# Patient Record
Sex: MALE | Race: BLACK OR AFRICAN AMERICAN | NOT HISPANIC OR LATINO | Employment: OTHER | URBAN - METROPOLITAN AREA
[De-identification: names, ages, dates, MRNs, and addresses within clinical notes are randomized per-mention and may not be internally consistent; named-entity substitution may affect disease eponyms.]

---

## 2022-04-25 DIAGNOSIS — N18.4 CHRONIC KIDNEY DISEASE, STAGE IV (SEVERE): Primary | ICD-10-CM

## 2022-04-27 ENCOUNTER — TELEPHONE (OUTPATIENT)
Dept: NEPHROLOGY | Facility: CLINIC | Age: 77
End: 2022-04-27

## 2022-04-27 NOTE — TELEPHONE ENCOUNTER
----- Message from Lizz Huang sent at 4/27/2022  1:20 PM CDT -----  Contact: Arlin  Type: Needs Medical Advice    Who Called: Arlin pt spouse  Best Call Back Number: [phone]  Additional  Information: Wife called an rescheduled appt to 5/5/22. Stated she's not feeling well  & unable to drive pt today  Please Advise- Thank you

## 2022-05-05 ENCOUNTER — OFFICE VISIT (OUTPATIENT)
Dept: NEPHROLOGY | Facility: CLINIC | Age: 77
End: 2022-05-05
Payer: COMMERCIAL

## 2022-05-05 VITALS
HEIGHT: 68 IN | OXYGEN SATURATION: 96 % | BODY MASS INDEX: 32.72 KG/M2 | DIASTOLIC BLOOD PRESSURE: 76 MMHG | TEMPERATURE: 98 F | HEART RATE: 48 BPM | SYSTOLIC BLOOD PRESSURE: 187 MMHG | WEIGHT: 215.88 LBS

## 2022-05-05 DIAGNOSIS — N18.4 CKD (CHRONIC KIDNEY DISEASE) STAGE 4, GFR 15-29 ML/MIN: Primary | ICD-10-CM

## 2022-05-05 PROCEDURE — 3066F PR DOCUMENTATION OF TREATMENT FOR NEPHROPATHY: ICD-10-PCS | Mod: S$GLB,,, | Performed by: INTERNAL MEDICINE

## 2022-05-05 PROCEDURE — 3288F FALL RISK ASSESSMENT DOCD: CPT | Mod: S$GLB,,, | Performed by: INTERNAL MEDICINE

## 2022-05-05 PROCEDURE — 1126F AMNT PAIN NOTED NONE PRSNT: CPT | Mod: S$GLB,,, | Performed by: INTERNAL MEDICINE

## 2022-05-05 PROCEDURE — 1159F MED LIST DOCD IN RCRD: CPT | Mod: S$GLB,,, | Performed by: INTERNAL MEDICINE

## 2022-05-05 PROCEDURE — 3288F PR FALLS RISK ASSESSMENT DOCUMENTED: ICD-10-PCS | Mod: S$GLB,,, | Performed by: INTERNAL MEDICINE

## 2022-05-05 PROCEDURE — 99213 OFFICE O/P EST LOW 20 MIN: CPT | Mod: S$GLB,,, | Performed by: INTERNAL MEDICINE

## 2022-05-05 PROCEDURE — 1160F PR REVIEW ALL MEDS BY PRESCRIBER/CLIN PHARMACIST DOCUMENTED: ICD-10-PCS | Mod: S$GLB,,, | Performed by: INTERNAL MEDICINE

## 2022-05-05 PROCEDURE — 3066F NEPHROPATHY DOC TX: CPT | Mod: S$GLB,,, | Performed by: INTERNAL MEDICINE

## 2022-05-05 PROCEDURE — 3078F PR MOST RECENT DIASTOLIC BLOOD PRESSURE < 80 MM HG: ICD-10-PCS | Mod: S$GLB,,, | Performed by: INTERNAL MEDICINE

## 2022-05-05 PROCEDURE — 1126F PR PAIN SEVERITY QUANTIFIED, NO PAIN PRESENT: ICD-10-PCS | Mod: S$GLB,,, | Performed by: INTERNAL MEDICINE

## 2022-05-05 PROCEDURE — 3008F BODY MASS INDEX DOCD: CPT | Mod: S$GLB,,, | Performed by: INTERNAL MEDICINE

## 2022-05-05 PROCEDURE — 1160F RVW MEDS BY RX/DR IN RCRD: CPT | Mod: S$GLB,,, | Performed by: INTERNAL MEDICINE

## 2022-05-05 PROCEDURE — 3077F SYST BP >= 140 MM HG: CPT | Mod: S$GLB,,, | Performed by: INTERNAL MEDICINE

## 2022-05-05 PROCEDURE — 3078F DIAST BP <80 MM HG: CPT | Mod: S$GLB,,, | Performed by: INTERNAL MEDICINE

## 2022-05-05 PROCEDURE — 1101F PR PT FALLS ASSESS DOC 0-1 FALLS W/OUT INJ PAST YR: ICD-10-PCS | Mod: S$GLB,,, | Performed by: INTERNAL MEDICINE

## 2022-05-05 PROCEDURE — 3008F PR BODY MASS INDEX (BMI) DOCUMENTED: ICD-10-PCS | Mod: S$GLB,,, | Performed by: INTERNAL MEDICINE

## 2022-05-05 PROCEDURE — 3077F PR MOST RECENT SYSTOLIC BLOOD PRESSURE >= 140 MM HG: ICD-10-PCS | Mod: S$GLB,,, | Performed by: INTERNAL MEDICINE

## 2022-05-05 PROCEDURE — 1159F PR MEDICATION LIST DOCUMENTED IN MEDICAL RECORD: ICD-10-PCS | Mod: S$GLB,,, | Performed by: INTERNAL MEDICINE

## 2022-05-05 PROCEDURE — 1101F PT FALLS ASSESS-DOCD LE1/YR: CPT | Mod: S$GLB,,, | Performed by: INTERNAL MEDICINE

## 2022-05-05 PROCEDURE — 99213 PR OFFICE/OUTPT VISIT, EST, LEVL III, 20-29 MIN: ICD-10-PCS | Mod: S$GLB,,, | Performed by: INTERNAL MEDICINE

## 2022-05-05 NOTE — PROGRESS NOTES
"Nephrology Progress Note      Patient Name:  Adiel Gerber    :  1945    Medical Record:  [id number]    Interval History:  No complaints.    Subjective:  Patient seen and examined. No complaints.    Current Medications: Current medications reviewed.  States hasn't taken all his bp meds this am.    Review of Systems  Review of Systems   Respiratory: Negative for cough, hemoptysis, shortness of breath and wheezing.    Cardiovascular: Negative for chest pain.   Gastrointestinal: Negative for abdominal pain, blood in stool, diarrhea, heartburn, nausea and vomiting.   Genitourinary: Negative for dysuria and hematuria.       Objective:      Physical Exam:   Vital Signs:  BP (!) 187/76 (BP Location: Left arm, Patient Position: Sitting, BP Method: Large (Automatic))   Pulse (!) 48   Temp 97.7 °F (36.5 °C) (Oral)   Ht 5' 8" (1.727 m)   Wt 97.9 kg (215 lb 14.4 oz)   SpO2 96%   BMI 32.83 kg/m²   Constitutional:  WNL  HENT:  Normocephalic, Atraumatic, Pupils Reactive, Normal oropharynx, Nose normal.   Cardiovascular:  Normal heart rate, Normal rhythm, No murmurs, No rubs, No gallops.   Respiratory:  Normal breath sounds, No respiratory distress, No wheezing, No chest tenderness.   GI:  Bowel sounds normal, Soft, No tenderness, No masses, No pulsatile masses.  : No costovertebral angle tenderness    Extremities:  Intact distal pulses, No edema, No tenderness, No cyanosis, No clubbing.   Neurologic:  Alert & oriented x 3, Normal motor function, Normal sensory function, No focal deficits noted.  Skin:  Warm and dry      Labs:  No results for input(s): K, CO2, BUN, ALBUMIN, CALCIUM, GLU, HGB, WBC, PLT, INR in the last 72 hours.    Invalid input(s): CRE  .    Radiology:   None.    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    Plan:  RTC 3 months.    Electronically signed by: Sylvia Fishman, 2022 3:54 PM.    "

## 2022-05-11 ENCOUNTER — TELEPHONE (OUTPATIENT)
Dept: NEPHROLOGY | Facility: CLINIC | Age: 77
End: 2022-05-11
Payer: COMMERCIAL

## 2022-05-11 NOTE — TELEPHONE ENCOUNTER
----- Message from Mayelin Atkinson sent at 5/11/2022  1:23 PM CDT -----  Contact: pt wife  Type: Needs Medical Advice    Who Called: pt wife   Best Call Back Number: [phone]  Inquiry/Question: pt wife calling to see if pt needs to be seen or if catheter could be prescribed thru home health, pt having trouble with bladder control, urinating when standing up or moving to do anything, please advise Thank you~

## 2022-05-12 NOTE — TELEPHONE ENCOUNTER
Spoke with pt's wife and informed of home health can do catheter under supervision of urology as bladder issues are their specialty.

## 2022-08-04 ENCOUNTER — OFFICE VISIT (OUTPATIENT)
Dept: NEPHROLOGY | Facility: CLINIC | Age: 77
End: 2022-08-04
Payer: COMMERCIAL

## 2022-08-04 VITALS
BODY MASS INDEX: 32.68 KG/M2 | TEMPERATURE: 99 F | DIASTOLIC BLOOD PRESSURE: 74 MMHG | HEART RATE: 73 BPM | SYSTOLIC BLOOD PRESSURE: 176 MMHG | WEIGHT: 215.63 LBS | OXYGEN SATURATION: 98 % | HEIGHT: 68 IN

## 2022-08-04 DIAGNOSIS — N18.4 CKD (CHRONIC KIDNEY DISEASE) STAGE 4, GFR 15-29 ML/MIN: Primary | ICD-10-CM

## 2022-08-04 PROCEDURE — 3066F NEPHROPATHY DOC TX: CPT | Mod: S$GLB,,, | Performed by: INTERNAL MEDICINE

## 2022-08-04 PROCEDURE — 1101F PR PT FALLS ASSESS DOC 0-1 FALLS W/OUT INJ PAST YR: ICD-10-PCS | Mod: S$GLB,,, | Performed by: INTERNAL MEDICINE

## 2022-08-04 PROCEDURE — 3078F PR MOST RECENT DIASTOLIC BLOOD PRESSURE < 80 MM HG: ICD-10-PCS | Mod: S$GLB,,, | Performed by: INTERNAL MEDICINE

## 2022-08-04 PROCEDURE — 99213 PR OFFICE/OUTPT VISIT, EST, LEVL III, 20-29 MIN: ICD-10-PCS | Mod: S$GLB,,, | Performed by: INTERNAL MEDICINE

## 2022-08-04 PROCEDURE — 1160F PR REVIEW ALL MEDS BY PRESCRIBER/CLIN PHARMACIST DOCUMENTED: ICD-10-PCS | Mod: S$GLB,,, | Performed by: INTERNAL MEDICINE

## 2022-08-04 PROCEDURE — 1101F PT FALLS ASSESS-DOCD LE1/YR: CPT | Mod: S$GLB,,, | Performed by: INTERNAL MEDICINE

## 2022-08-04 PROCEDURE — 1126F PR PAIN SEVERITY QUANTIFIED, NO PAIN PRESENT: ICD-10-PCS | Mod: S$GLB,,, | Performed by: INTERNAL MEDICINE

## 2022-08-04 PROCEDURE — 3078F DIAST BP <80 MM HG: CPT | Mod: S$GLB,,, | Performed by: INTERNAL MEDICINE

## 2022-08-04 PROCEDURE — 3077F PR MOST RECENT SYSTOLIC BLOOD PRESSURE >= 140 MM HG: ICD-10-PCS | Mod: S$GLB,,, | Performed by: INTERNAL MEDICINE

## 2022-08-04 PROCEDURE — 3288F PR FALLS RISK ASSESSMENT DOCUMENTED: ICD-10-PCS | Mod: S$GLB,,, | Performed by: INTERNAL MEDICINE

## 2022-08-04 PROCEDURE — 1160F RVW MEDS BY RX/DR IN RCRD: CPT | Mod: S$GLB,,, | Performed by: INTERNAL MEDICINE

## 2022-08-04 PROCEDURE — 3077F SYST BP >= 140 MM HG: CPT | Mod: S$GLB,,, | Performed by: INTERNAL MEDICINE

## 2022-08-04 PROCEDURE — 1126F AMNT PAIN NOTED NONE PRSNT: CPT | Mod: S$GLB,,, | Performed by: INTERNAL MEDICINE

## 2022-08-04 PROCEDURE — 3066F PR DOCUMENTATION OF TREATMENT FOR NEPHROPATHY: ICD-10-PCS | Mod: S$GLB,,, | Performed by: INTERNAL MEDICINE

## 2022-08-04 PROCEDURE — 1159F MED LIST DOCD IN RCRD: CPT | Mod: S$GLB,,, | Performed by: INTERNAL MEDICINE

## 2022-08-04 PROCEDURE — 3288F FALL RISK ASSESSMENT DOCD: CPT | Mod: S$GLB,,, | Performed by: INTERNAL MEDICINE

## 2022-08-04 PROCEDURE — 99213 OFFICE O/P EST LOW 20 MIN: CPT | Mod: S$GLB,,, | Performed by: INTERNAL MEDICINE

## 2022-08-04 PROCEDURE — 1159F PR MEDICATION LIST DOCUMENTED IN MEDICAL RECORD: ICD-10-PCS | Mod: S$GLB,,, | Performed by: INTERNAL MEDICINE

## 2022-08-04 NOTE — PROGRESS NOTES
"Nephrology Progress Note      Patient Name:  Adiel Gerber    :  1945    Medical Record:  [id number]    Interval History:   Seeing for ckd.    Subjective:  Patient seen and examined. No complaints.    Current Medications: Current medications reviewed.    Review of Systems  Review of Systems   Respiratory: Negative for cough, hemoptysis, sputum production and shortness of breath.    Cardiovascular: Negative for chest pain, palpitations, orthopnea, claudication and leg swelling.   Gastrointestinal: Negative for abdominal pain, blood in stool, constipation, diarrhea, heartburn, nausea and vomiting.   Genitourinary: Negative for dysuria, frequency, hematuria and urgency.       Objective:      Physical Exam:   Vital Signs:  BP (!) 176/74 (BP Location: Left arm, Patient Position: Sitting, BP Method: Large (Automatic))   Pulse 73   Temp 98.7 °F (37.1 °C) (Oral)   Ht 5' 8" (1.727 m)   Wt 97.8 kg (215 lb 9.6 oz)   SpO2 98%   BMI 32.78 kg/m²   Constitutional:  WNL  HENT:  Normocephalic, Atraumatic, Pupils Reactive, Normal oropharynx, Nose normal.   Cardiovascular:  Normal heart rate, Normal rhythm, No murmurs, No rubs, No gallops.   Respiratory:  Normal breath sounds, No respiratory distress, No wheezing, No chest tenderness.   GI:  Bowel sounds normal, Soft, No tenderness, No masses, No pulsatile masses.  : No costovertebral angle tenderness    Extremities:  Intact distal pulses, ++ edema, No tenderness, No cyanosis, No clubbing.   Neurologic:  Alert & oriented x 3, Normal motor function, Normal sensory function, No focal deficits noted.  Skin:  Warm and dry      Labs:  Recent Labs     22  1045   K 4.1   CO2 28   BUN 95*   ALBUMIN 3.8   CALCIUM 8.0*     .    Radiology:   none    Assessment:  ckd 4- near stage5.      Plan:  K[facility]ney [facility] class.  RTC 3 months.    Electronically signed by: Sylvia Fishman, 2022 3:30 PM.    "

## 2022-12-19 ENCOUNTER — OFFICE VISIT (OUTPATIENT)
Dept: NEPHROLOGY | Facility: CLINIC | Age: 77
End: 2022-12-19
Payer: COMMERCIAL

## 2022-12-19 VITALS
HEART RATE: 63 BPM | WEIGHT: 228.81 LBS | DIASTOLIC BLOOD PRESSURE: 81 MMHG | HEIGHT: 68 IN | SYSTOLIC BLOOD PRESSURE: 204 MMHG | OXYGEN SATURATION: 97 % | BODY MASS INDEX: 34.68 KG/M2

## 2022-12-19 DIAGNOSIS — N18.4 TYPE 2 DIABETES MELLITUS WITH STAGE 4 CHRONIC KIDNEY DISEASE, WITHOUT LONG-TERM CURRENT USE OF INSULIN: ICD-10-CM

## 2022-12-19 DIAGNOSIS — E78.00 HYPERCHOLESTEROLEMIA: ICD-10-CM

## 2022-12-19 DIAGNOSIS — N18.4 STAGE 4 CHRONIC KIDNEY DISEASE: Primary | ICD-10-CM

## 2022-12-19 DIAGNOSIS — I10 PRIMARY HYPERTENSION: ICD-10-CM

## 2022-12-19 DIAGNOSIS — E11.22 TYPE 2 DIABETES MELLITUS WITH STAGE 4 CHRONIC KIDNEY DISEASE, WITHOUT LONG-TERM CURRENT USE OF INSULIN: ICD-10-CM

## 2022-12-19 PROCEDURE — 1101F PT FALLS ASSESS-DOCD LE1/YR: CPT | Mod: S$GLB,,, | Performed by: INTERNAL MEDICINE

## 2022-12-19 PROCEDURE — 99213 OFFICE O/P EST LOW 20 MIN: CPT | Mod: S$GLB,,, | Performed by: INTERNAL MEDICINE

## 2022-12-19 PROCEDURE — 3077F SYST BP >= 140 MM HG: CPT | Mod: S$GLB,,, | Performed by: INTERNAL MEDICINE

## 2022-12-19 PROCEDURE — 1159F PR MEDICATION LIST DOCUMENTED IN MEDICAL RECORD: ICD-10-PCS | Mod: S$GLB,,, | Performed by: INTERNAL MEDICINE

## 2022-12-19 PROCEDURE — 3066F PR DOCUMENTATION OF TREATMENT FOR NEPHROPATHY: ICD-10-PCS | Mod: S$GLB,,, | Performed by: INTERNAL MEDICINE

## 2022-12-19 PROCEDURE — 1126F PR PAIN SEVERITY QUANTIFIED, NO PAIN PRESENT: ICD-10-PCS | Mod: S$GLB,,, | Performed by: INTERNAL MEDICINE

## 2022-12-19 PROCEDURE — 1159F MED LIST DOCD IN RCRD: CPT | Mod: S$GLB,,, | Performed by: INTERNAL MEDICINE

## 2022-12-19 PROCEDURE — 3066F NEPHROPATHY DOC TX: CPT | Mod: S$GLB,,, | Performed by: INTERNAL MEDICINE

## 2022-12-19 PROCEDURE — 3079F DIAST BP 80-89 MM HG: CPT | Mod: S$GLB,,, | Performed by: INTERNAL MEDICINE

## 2022-12-19 PROCEDURE — 3079F PR MOST RECENT DIASTOLIC BLOOD PRESSURE 80-89 MM HG: ICD-10-PCS | Mod: S$GLB,,, | Performed by: INTERNAL MEDICINE

## 2022-12-19 PROCEDURE — 3288F FALL RISK ASSESSMENT DOCD: CPT | Mod: S$GLB,,, | Performed by: INTERNAL MEDICINE

## 2022-12-19 PROCEDURE — 1160F PR REVIEW ALL MEDS BY PRESCRIBER/CLIN PHARMACIST DOCUMENTED: ICD-10-PCS | Mod: S$GLB,,, | Performed by: INTERNAL MEDICINE

## 2022-12-19 PROCEDURE — 3077F PR MOST RECENT SYSTOLIC BLOOD PRESSURE >= 140 MM HG: ICD-10-PCS | Mod: S$GLB,,, | Performed by: INTERNAL MEDICINE

## 2022-12-19 PROCEDURE — 1160F RVW MEDS BY RX/DR IN RCRD: CPT | Mod: S$GLB,,, | Performed by: INTERNAL MEDICINE

## 2022-12-19 PROCEDURE — 1126F AMNT PAIN NOTED NONE PRSNT: CPT | Mod: S$GLB,,, | Performed by: INTERNAL MEDICINE

## 2022-12-19 PROCEDURE — 3288F PR FALLS RISK ASSESSMENT DOCUMENTED: ICD-10-PCS | Mod: S$GLB,,, | Performed by: INTERNAL MEDICINE

## 2022-12-19 PROCEDURE — 1101F PR PT FALLS ASSESS DOC 0-1 FALLS W/OUT INJ PAST YR: ICD-10-PCS | Mod: S$GLB,,, | Performed by: INTERNAL MEDICINE

## 2022-12-19 PROCEDURE — 99213 PR OFFICE/OUTPT VISIT, EST, LEVL III, 20-29 MIN: ICD-10-PCS | Mod: S$GLB,,, | Performed by: INTERNAL MEDICINE

## 2022-12-19 NOTE — PROGRESS NOTES
Pt Name:  Adiel Gerber  Pt :  1945  Pt MRN:  [id number]    Date: 2022  Provider: Sylvia Fishman    Reason for visit: seeing for ckd. No complaints.      Chief Complaint:   Chief Complaint   Patient presents with    Chronic Kidney Disease     Stage 4 cr 3.84 GFR 16    Follow-up     3 month       HPI:  [unfilled]  No complaints. Forgets to take bp pills.    History:   Past Medical History:   Diagnosis Date    Arthritis     CHF (congestive heart failure)     CKD (chronic kidney disease)     HTN (hypertension)     Hypercholesteremia     Hyponatremia     Obesity, unspecified     TOBIAS (obstructive sleep apnea)     Proteinuria     Stroke     Type 2 diabetes mellitus      Past Surgical History:   Procedure Laterality Date    ANKLE SURGERY      HEMORRHOID SURGERY      NASAL SINUS SURGERY       Family History   Problem Relation Age of Onset    Hypertension Father     Hyperlipidemia Father     Diabetes Father     Heart disease Father     Vision loss Father     Diabetes Sister      Social History     Substance and Sexual Activity   Alcohol Use Yes    Comment: occasionally     Social History     Substance and Sexual Activity   Drug Use Never     Social History     Substance and Sexual Activity   Sexual Activity Not Currently     reports that he is not currently sexually active.  Social History     Tobacco Use   Smoking Status Former    Years: 17.00    Types: Cigarettes    Quit date: 2004    Years since quittin.2   Smokeless Tobacco Never       Allergies:  Review of patient's allergies indicates:   Allergen Reactions    Milk containing products Diarrhea       [unfilled]    ROS:   Constitutional:  Denies fever or chills   Eyes:  Denies change in visual acuity   HENT:  Denies nasal congestion or sore throat   Respiratory:  As in the history of the present illness.   Cardiovascular:  As in the history of the present illness.   GI:  As in the history of the present illness.    Musculoskeletal:  Denies back  "pain or joint pain   Integument:  Denies rash   Neurologic:  Denies headache, focal weakness or sensory changes   Endocrine:  Denies polyuria or polydipsia   Lymphatic:  Denies swollen glands   Psychiatric:  Denies depression or anxiety    Physical Exam:   Vitals:   Vitals:    12/19/22 1322 12/19/22 1327   BP: (!) 201/83 (!) 204/81   Pulse: 63    SpO2: 97%    Weight: 103.8 kg (228 lb 12.8 oz)    Height: 5' 8" (1.727 m)    PainSc: 0-No pain      Body mass index is 34.79 kg/m².    Constitutional:  Well developed, well nourished, and in no acute distress   Eyes:  PERRLA, conjunctiva normal   HENT:  Atraumatic, external ears normal, nose normal.  Neck: There is no jugular venous distension or thyromegaly.   Respiratory:  No respiratory distress, normal breath sounds, no rales, no wheezing   Cardiovascular:  Normal rate, and a regular rhythm, no murmurs, no gallops, no rub, and no edema.  GI:  Normal bowel sounds.  Musculoskeletal:  No deformities.   Neurologic:  Alert & oriented x 3, CN 2-12 normal, normal motor function, and no asterixis.   Psychiatric:  Speech and behavior appropriate.    Labs/Tests:  Lab Results   Component Value Date     11/03/2022     11/03/2022    K 4.2 11/03/2022    K 4.2 11/03/2022     11/03/2022     11/03/2022    CO2 27 11/03/2022    CO2 27 11/03/2022    BUN 74 (H) 11/03/2022    BUN 74 (H) 11/03/2022    CREATININE 3.79 (H) 11/03/2022    CREATININE 3.79 (H) 11/03/2022    CREATININE 3.77 (H) 08/03/2022    GLUCOSE 478 (H) 11/03/2022    GLUCOSE 478 (H) 11/03/2022    CALCIUM 7.5 (L) 11/03/2022    CALCIUM 7.5 (L) 11/03/2022    PHOSPHORUS 5.3 (H) 11/03/2022    ALBUMIN 3.9 11/03/2022    ALBUMIN 3.9 11/03/2022    EGFRNONAA 15 (L) 11/03/2022    EGFRNONAA 15 (L) 11/03/2022    EGFRNONAA 15 (L) 08/03/2022    ESTGFRAFRICA 17 (L) 11/03/2022    ESTGFRAFRICA 17 (L) 11/03/2022    ESTGFRAFRICA 17 (L) 08/03/2022     (H) 12/14/2022    HGB 8.7 (L) 12/14/2022    HGB 8.9 (L) 06/02/2022 "    HGB 9.0 (L) 06/01/2022    HGBA1C 10.6 (H) 06/02/2022    IRON 45 (L) 04/26/2022    TIBC 266 04/26/2022    FERRITIN 114.9 12/02/2021    TRIG 167 (H) 11/02/2021    CHOL 187 11/02/2021    HDL 37 (L) 11/02/2021    LDLCALC 117 (H) 11/02/2021    LABVLDL 33 (H) 11/02/2021    [id number] 13.0 (L) 06/02/2022       Assessment & Plan:    Visit Diagnosis:   [unfilled]  [unfilled]    Problem List: There is no problem list on file for this patient.    Ckd 4 - discussed dialysis.transplant center.    Htn - take meds and take bp measurement at home.    Dm 2 on insulin    hypercholesterolemia    Follow Up:   No follow-ups on file.

## 2022-12-20 ENCOUNTER — TELEPHONE (OUTPATIENT)
Dept: TRANSPLANT | Facility: CLINIC | Age: 77
End: 2022-12-20
Payer: COMMERCIAL

## 2022-12-22 ENCOUNTER — TELEPHONE (OUTPATIENT)
Dept: TRANSPLANT | Facility: CLINIC | Age: 77
End: 2022-12-22
Payer: COMMERCIAL

## 2022-12-22 NOTE — TELEPHONE ENCOUNTER
----- Message from Carol Ann Beck DO sent at 12/21/2022 12:42 PM CST -----  Not eligible for RR    Carol Ann   ----- Message -----  From: Michelle Abadie, RN  Sent: 12/21/2022  11:52 AM CST  To: Manish Guidry MD, #    Good morning,    We have received a referral on the above patient. He is 77 years old with a BMI of 35. He is Pre-dialysis. 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 This is all the information available.    Is patient eligible to come to RR clinic?    Thank you,  Bessy

## 2022-12-30 ENCOUNTER — TELEPHONE (OUTPATIENT)
Dept: TRANSPLANT | Facility: CLINIC | Age: 77
End: 2022-12-30
Payer: COMMERCIAL

## 2023-01-01 ENCOUNTER — OUTSIDE PLACE OF SERVICE (OUTPATIENT)
Dept: NEPHROLOGY | Facility: CLINIC | Age: 78
End: 2023-01-01
Payer: COMMERCIAL

## 2023-01-04 ENCOUNTER — TELEPHONE (OUTPATIENT)
Dept: TRANSPLANT | Facility: CLINIC | Age: 78
End: 2023-01-04
Payer: COMMERCIAL

## 2023-01-04 NOTE — TELEPHONE ENCOUNTER
----- Message from Manish Guidry MD sent at 1/4/2023 10:50 AM CST -----  Agree   ----- Message -----  From: Mariah Osborn MD  Sent: 1/3/2023   5:40 PM CST  To: Manish Guidry MD, Madie Rojas MD, #    Not a candidate d/t age + BMI, + heart failure + stroke with deficits + lung issues.    ----- Message -----  From: Manish Guidry MD  Sent: 12/21/2022   1:47 PM CST  To: Mariah Osborn MD, #    To me not a candidate to be seen in clinic. Would you guys agree?  ----- Message -----  From: Michelle Abadie, RN  Sent: 12/21/2022  11:52 AM CST  To: Manish Guidry MD, #    Good morning,    We have received a referral on the above patient. He is 77 years old with a BMI of 35. He is Pre-dialysis. 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 This is all the information available.    Is patient eligible to come to RR clinic?    Thank you,  Bessy

## 2023-01-10 PROCEDURE — 99223 PR INITIAL HOSPITAL CARE,LEVL III: ICD-10-PCS | Mod: ,,, | Performed by: INTERNAL MEDICINE

## 2023-01-10 PROCEDURE — 99223 1ST HOSP IP/OBS HIGH 75: CPT | Mod: ,,, | Performed by: INTERNAL MEDICINE

## 2023-01-11 PROCEDURE — 99233 PR SUBSEQUENT HOSPITAL CARE,LEVL III: ICD-10-PCS | Mod: ,,, | Performed by: INTERNAL MEDICINE

## 2023-01-11 PROCEDURE — 99233 SBSQ HOSP IP/OBS HIGH 50: CPT | Mod: ,,, | Performed by: INTERNAL MEDICINE

## 2023-01-12 PROCEDURE — 99233 PR SUBSEQUENT HOSPITAL CARE,LEVL III: ICD-10-PCS | Mod: ,,, | Performed by: INTERNAL MEDICINE

## 2023-01-12 PROCEDURE — 99233 SBSQ HOSP IP/OBS HIGH 50: CPT | Mod: ,,, | Performed by: INTERNAL MEDICINE

## 2023-01-13 PROCEDURE — 99233 PR SUBSEQUENT HOSPITAL CARE,LEVL III: ICD-10-PCS | Mod: ,,, | Performed by: INTERNAL MEDICINE

## 2023-01-13 PROCEDURE — 99233 SBSQ HOSP IP/OBS HIGH 50: CPT | Mod: ,,, | Performed by: INTERNAL MEDICINE

## 2023-01-14 PROCEDURE — 99232 SBSQ HOSP IP/OBS MODERATE 35: CPT | Mod: ,,, | Performed by: INTERNAL MEDICINE

## 2023-01-14 PROCEDURE — 99232 PR SUBSEQUENT HOSPITAL CARE,LEVL II: ICD-10-PCS | Mod: ,,, | Performed by: INTERNAL MEDICINE

## 2023-01-15 PROCEDURE — 99232 PR SUBSEQUENT HOSPITAL CARE,LEVL II: ICD-10-PCS | Mod: ,,, | Performed by: INTERNAL MEDICINE

## 2023-01-15 PROCEDURE — 99232 SBSQ HOSP IP/OBS MODERATE 35: CPT | Mod: ,,, | Performed by: INTERNAL MEDICINE

## 2023-01-16 PROCEDURE — 99233 PR SUBSEQUENT HOSPITAL CARE,LEVL III: ICD-10-PCS | Mod: ,,, | Performed by: INTERNAL MEDICINE

## 2023-01-16 PROCEDURE — 99233 SBSQ HOSP IP/OBS HIGH 50: CPT | Mod: ,,, | Performed by: INTERNAL MEDICINE

## 2023-01-17 PROCEDURE — 99233 SBSQ HOSP IP/OBS HIGH 50: CPT | Mod: ,,, | Performed by: INTERNAL MEDICINE

## 2023-01-17 PROCEDURE — 99233 PR SUBSEQUENT HOSPITAL CARE,LEVL III: ICD-10-PCS | Mod: ,,, | Performed by: INTERNAL MEDICINE

## 2023-01-18 PROCEDURE — 99233 SBSQ HOSP IP/OBS HIGH 50: CPT | Mod: ,,, | Performed by: INTERNAL MEDICINE

## 2023-01-18 PROCEDURE — 99233 PR SUBSEQUENT HOSPITAL CARE,LEVL III: ICD-10-PCS | Mod: ,,, | Performed by: INTERNAL MEDICINE

## 2023-01-19 PROCEDURE — 99233 PR SUBSEQUENT HOSPITAL CARE,LEVL III: ICD-10-PCS | Mod: ,,, | Performed by: INTERNAL MEDICINE

## 2023-01-19 PROCEDURE — 99233 SBSQ HOSP IP/OBS HIGH 50: CPT | Mod: ,,, | Performed by: INTERNAL MEDICINE

## 2023-01-20 PROCEDURE — 99233 SBSQ HOSP IP/OBS HIGH 50: CPT | Mod: ,,, | Performed by: INTERNAL MEDICINE

## 2023-01-20 PROCEDURE — 99233 PR SUBSEQUENT HOSPITAL CARE,LEVL III: ICD-10-PCS | Mod: ,,, | Performed by: INTERNAL MEDICINE

## 2023-01-25 ENCOUNTER — OFFICE VISIT (OUTPATIENT)
Dept: NEPHROLOGY | Facility: CLINIC | Age: 78
End: 2023-01-25
Payer: COMMERCIAL

## 2023-01-25 VITALS
OXYGEN SATURATION: 97 % | HEART RATE: 70 BPM | WEIGHT: 196.38 LBS | BODY MASS INDEX: 29.76 KG/M2 | DIASTOLIC BLOOD PRESSURE: 76 MMHG | HEIGHT: 68 IN | SYSTOLIC BLOOD PRESSURE: 181 MMHG

## 2023-01-25 DIAGNOSIS — E11.22 TYPE 2 DIABETES MELLITUS WITH STAGE 5 CHRONIC KIDNEY DISEASE NOT ON CHRONIC DIALYSIS, WITH LONG-TERM CURRENT USE OF INSULIN: ICD-10-CM

## 2023-01-25 DIAGNOSIS — I10 PRIMARY HYPERTENSION: ICD-10-CM

## 2023-01-25 DIAGNOSIS — N18.5 CKD (CHRONIC KIDNEY DISEASE) STAGE 5, GFR LESS THAN 15 ML/MIN: Primary | ICD-10-CM

## 2023-01-25 DIAGNOSIS — N18.5 TYPE 2 DIABETES MELLITUS WITH STAGE 5 CHRONIC KIDNEY DISEASE NOT ON CHRONIC DIALYSIS, WITH LONG-TERM CURRENT USE OF INSULIN: ICD-10-CM

## 2023-01-25 DIAGNOSIS — Z79.4 TYPE 2 DIABETES MELLITUS WITH STAGE 5 CHRONIC KIDNEY DISEASE NOT ON CHRONIC DIALYSIS, WITH LONG-TERM CURRENT USE OF INSULIN: ICD-10-CM

## 2023-01-25 PROCEDURE — 3078F DIAST BP <80 MM HG: CPT | Mod: S$GLB,,, | Performed by: INTERNAL MEDICINE

## 2023-01-25 PROCEDURE — 3077F SYST BP >= 140 MM HG: CPT | Mod: S$GLB,,, | Performed by: INTERNAL MEDICINE

## 2023-01-25 PROCEDURE — 1160F RVW MEDS BY RX/DR IN RCRD: CPT | Mod: S$GLB,,, | Performed by: INTERNAL MEDICINE

## 2023-01-25 PROCEDURE — 1160F PR REVIEW ALL MEDS BY PRESCRIBER/CLIN PHARMACIST DOCUMENTED: ICD-10-PCS | Mod: S$GLB,,, | Performed by: INTERNAL MEDICINE

## 2023-01-25 PROCEDURE — 1101F PR PT FALLS ASSESS DOC 0-1 FALLS W/OUT INJ PAST YR: ICD-10-PCS | Mod: S$GLB,,, | Performed by: INTERNAL MEDICINE

## 2023-01-25 PROCEDURE — 1159F PR MEDICATION LIST DOCUMENTED IN MEDICAL RECORD: ICD-10-PCS | Mod: S$GLB,,, | Performed by: INTERNAL MEDICINE

## 2023-01-25 PROCEDURE — 3078F PR MOST RECENT DIASTOLIC BLOOD PRESSURE < 80 MM HG: ICD-10-PCS | Mod: S$GLB,,, | Performed by: INTERNAL MEDICINE

## 2023-01-25 PROCEDURE — 99213 PR OFFICE/OUTPT VISIT, EST, LEVL III, 20-29 MIN: ICD-10-PCS | Mod: S$GLB,,, | Performed by: INTERNAL MEDICINE

## 2023-01-25 PROCEDURE — 1101F PT FALLS ASSESS-DOCD LE1/YR: CPT | Mod: S$GLB,,, | Performed by: INTERNAL MEDICINE

## 2023-01-25 PROCEDURE — 3077F PR MOST RECENT SYSTOLIC BLOOD PRESSURE >= 140 MM HG: ICD-10-PCS | Mod: S$GLB,,, | Performed by: INTERNAL MEDICINE

## 2023-01-25 PROCEDURE — 1159F MED LIST DOCD IN RCRD: CPT | Mod: S$GLB,,, | Performed by: INTERNAL MEDICINE

## 2023-01-25 PROCEDURE — 3288F FALL RISK ASSESSMENT DOCD: CPT | Mod: S$GLB,,, | Performed by: INTERNAL MEDICINE

## 2023-01-25 PROCEDURE — 3288F PR FALLS RISK ASSESSMENT DOCUMENTED: ICD-10-PCS | Mod: S$GLB,,, | Performed by: INTERNAL MEDICINE

## 2023-01-25 PROCEDURE — 99213 OFFICE O/P EST LOW 20 MIN: CPT | Mod: S$GLB,,, | Performed by: INTERNAL MEDICINE

## 2023-01-25 PROCEDURE — 1126F PR PAIN SEVERITY QUANTIFIED, NO PAIN PRESENT: ICD-10-PCS | Mod: S$GLB,,, | Performed by: INTERNAL MEDICINE

## 2023-01-25 PROCEDURE — 1126F AMNT PAIN NOTED NONE PRSNT: CPT | Mod: S$GLB,,, | Performed by: INTERNAL MEDICINE

## 2023-01-25 RX ORDER — ACETAMINOPHEN 500 MG
5000 TABLET ORAL
COMMUNITY
Start: 2023-01-21

## 2023-01-25 RX ORDER — CALCIUM ACETATE 667 MG/1
1334 CAPSULE ORAL
COMMUNITY
Start: 2023-01-20 | End: 2023-02-19

## 2023-01-25 RX ORDER — MINOXIDIL 2.5 MG/1
5 TABLET ORAL
COMMUNITY
Start: 2023-01-21 | End: 2023-01-25 | Stop reason: SDUPTHER

## 2023-01-25 RX ORDER — CLONIDINE 0.1 MG/24H
1 PATCH, EXTENDED RELEASE TRANSDERMAL
COMMUNITY
Start: 2023-01-27

## 2023-01-25 RX ORDER — METOLAZONE 5 MG/1
5 TABLET ORAL
COMMUNITY
Start: 2023-01-21

## 2023-01-25 RX ORDER — MINOXIDIL 2.5 MG/1
5 TABLET ORAL 2 TIMES DAILY
Qty: 60 TABLET | Refills: 3 | Status: SHIPPED | OUTPATIENT
Start: 2023-01-25

## 2023-01-25 NOTE — PROGRESS NOTES
Pt Name:  Adiel Gerber  Pt :  1945  Pt MRN:  [id number]    Date: 2023  Provider: Sylvia Fishman    Reason for visit: seeing for ckd.      Chief Complaint:   Chief Complaint   Patient presents with    Chronic Kidney Disease     Stage-5, CR-4.56, GFR-13       HPI:  [unfilled]  No complaints.    History:   Past Medical History:   Diagnosis Date    Arthritis     CHF (congestive heart failure)     CKD (chronic kidney disease)     HTN (hypertension)     Hypercholesteremia     Hyponatremia     Obesity, unspecified     TOBIAS (obstructive sleep apnea)     Proteinuria     Stroke     Type 2 diabetes mellitus      Past Surgical History:   Procedure Laterality Date    ANKLE SURGERY      HEMORRHOID SURGERY      NASAL SINUS SURGERY       Family History   Problem Relation Age of Onset    Hypertension Father     Hyperlipidemia Father     Diabetes Father     Heart disease Father     Vision loss Father     Diabetes Sister      Social History     Substance and Sexual Activity   Alcohol Use Yes    Comment: occasionally     Social History     Substance and Sexual Activity   Drug Use Never     Social History     Substance and Sexual Activity   Sexual Activity Not Currently     reports that he is not currently sexually active.  Social History     Tobacco Use   Smoking Status Former    Years: 17.00    Types: Cigarettes    Quit date: 2004    Years since quittin.3   Smokeless Tobacco Never       Allergies:  Review of patient's allergies indicates:   Allergen Reactions    Milk containing products Diarrhea       [unfilled]    ROS:   Constitutional:  Denies fever or chills   Eyes:  Denies change in visual acuity   HENT:  Denies nasal congestion or sore throat   Respiratory:  As in the history of the present illness.   Cardiovascular:  As in the history of the present illness.   GI:  As in the history of the present illness.    Musculoskeletal:  Denies back pain or joint pain   Integument:  Denies rash   Neurologic:  Denies  "headache, focal weakness or sensory changes   Endocrine:  Denies polyuria or polydipsia   Lymphatic:  Denies swollen glands   Psychiatric:  Denies depression or anxiety    Physical Exam:   Vitals:   Vitals:    01/25/23 1504   BP: (!) 181/76   Pulse: 70   SpO2: 97%   Weight: 89.1 kg (196 lb 6.4 oz)   Height: 5' 8" (1.727 m)   PainSc: 0-No pain     Body mass index is 29.86 kg/m².    Constitutional:  Well developed, well nourished, and in no acute distress   Eyes:  PERRLA, conjunctiva normal   HENT:  Atraumatic, external ears normal, nose normal.  Neck: There is no jugular venous distension or thyromegaly.   Respiratory:  No respiratory distress, normal breath sounds, no rales, no wheezing   Cardiovascular:  Normal rate, and a regular rhythm, no murmurs, no gallops, no rub, and no edema.  GI:  Normal bowel sounds.  Musculoskeletal:  No deformities.   Neurologic:  Alert & oriented x 3, CN 2-12 normal, normal motor function, and no asterixis.   Psychiatric:  Speech and behavior appropriate.    Labs/Tests:  Lab Results   Component Value Date     11/03/2022     11/03/2022    K 4.2 11/03/2022    K 4.2 11/03/2022     11/03/2022     11/03/2022    CO2 27 11/03/2022    CO2 27 11/03/2022    BUN 74 (H) 11/03/2022    BUN 74 (H) 11/03/2022    CREATININE 3.79 (H) 11/03/2022    CREATININE 3.79 (H) 11/03/2022    CREATININE 3.77 (H) 08/03/2022    GLUCOSE 4+ (A) 01/24/2023    CALCIUM 7.5 (L) 11/03/2022    CALCIUM 7.5 (L) 11/03/2022    PHOSPHORUS 4.7 01/09/2023    ALBUMIN 3.9 11/03/2022    ALBUMIN 3.9 11/03/2022    EGFRNONAA 15 (L) 11/03/2022    EGFRNONAA 15 (L) 11/03/2022    EGFRNONAA 15 (L) 08/03/2022    ESTGFRAFRICA 17 (L) 11/03/2022    ESTGFRAFRICA 17 (L) 11/03/2022    ESTGFRAFRICA 17 (L) 08/03/2022     (H) 01/24/2023    HGB 9.0 (L) 01/24/2023    HGB 7.6 (L) 01/16/2023    HGB 7.5 (L) 01/15/2023    HGBA1C 10.6 (H) 06/02/2022    IRON 13 (L) 01/13/2023    TIBC 246 (L) 01/13/2023    FERRITIN 114.9 " 12/02/2021    TRIG 167 (H) 11/02/2021    CHOL 187 11/02/2021    HDL 37 (L) 11/02/2021    LDLCALC 117 (H) 11/02/2021    LABVLDL 33 (H) 11/02/2021    [id number] 20.6 (L) 01/10/2023       Assessment & Plan:    Visit Diagnosis:   [unfilled]  [unfilled]    Problem List: There is no problem list on file for this patient.    Ckd 5 - to kidney smart. Vein mapping.    Htn - increase minoxidil to bid.    NIDDM - on insulin.        Follow Up:   Follow up in about 1 month (around 2/25/2023).

## 2023-01-31 ENCOUNTER — TELEPHONE (OUTPATIENT)
Dept: TRANSPLANT | Facility: CLINIC | Age: 78
End: 2023-01-31
Payer: COMMERCIAL

## 2023-02-01 ENCOUNTER — OUTSIDE PLACE OF SERVICE (OUTPATIENT)
Dept: NEPHROLOGY | Facility: CLINIC | Age: 78
End: 2023-02-01
Payer: COMMERCIAL

## 2023-02-06 PROCEDURE — 99222 PR INITIAL HOSPITAL CARE,LEVL II: ICD-10-PCS | Mod: ,,, | Performed by: INTERNAL MEDICINE

## 2023-02-06 PROCEDURE — 99222 1ST HOSP IP/OBS MODERATE 55: CPT | Mod: ,,, | Performed by: INTERNAL MEDICINE

## 2023-02-07 PROCEDURE — 99232 SBSQ HOSP IP/OBS MODERATE 35: CPT | Mod: ,,, | Performed by: INTERNAL MEDICINE

## 2023-02-07 PROCEDURE — 99232 PR SUBSEQUENT HOSPITAL CARE,LEVL II: ICD-10-PCS | Mod: ,,, | Performed by: INTERNAL MEDICINE

## 2023-02-22 ENCOUNTER — OFFICE VISIT (OUTPATIENT)
Dept: NEPHROLOGY | Facility: CLINIC | Age: 78
End: 2023-02-22
Payer: COMMERCIAL

## 2023-02-22 VITALS
HEIGHT: 68 IN | SYSTOLIC BLOOD PRESSURE: 190 MMHG | DIASTOLIC BLOOD PRESSURE: 78 MMHG | BODY MASS INDEX: 30.21 KG/M2 | WEIGHT: 199.31 LBS | HEART RATE: 79 BPM

## 2023-02-22 DIAGNOSIS — N18.5 CKD (CHRONIC KIDNEY DISEASE) STAGE 5, GFR LESS THAN 15 ML/MIN: Primary | ICD-10-CM

## 2023-02-22 DIAGNOSIS — E10.22 TYPE 1 DIABETES MELLITUS WITH STAGE 5 CHRONIC KIDNEY DISEASE NOT ON CHRONIC DIALYSIS: ICD-10-CM

## 2023-02-22 DIAGNOSIS — I10 PRIMARY HYPERTENSION: ICD-10-CM

## 2023-02-22 DIAGNOSIS — N18.5 TYPE 1 DIABETES MELLITUS WITH STAGE 5 CHRONIC KIDNEY DISEASE NOT ON CHRONIC DIALYSIS: ICD-10-CM

## 2023-02-22 PROCEDURE — 1101F PR PT FALLS ASSESS DOC 0-1 FALLS W/OUT INJ PAST YR: ICD-10-PCS | Mod: S$GLB,,, | Performed by: INTERNAL MEDICINE

## 2023-02-22 PROCEDURE — 3077F PR MOST RECENT SYSTOLIC BLOOD PRESSURE >= 140 MM HG: ICD-10-PCS | Mod: S$GLB,,, | Performed by: INTERNAL MEDICINE

## 2023-02-22 PROCEDURE — 3078F PR MOST RECENT DIASTOLIC BLOOD PRESSURE < 80 MM HG: ICD-10-PCS | Mod: S$GLB,,, | Performed by: INTERNAL MEDICINE

## 2023-02-22 PROCEDURE — 1126F AMNT PAIN NOTED NONE PRSNT: CPT | Mod: S$GLB,,, | Performed by: INTERNAL MEDICINE

## 2023-02-22 PROCEDURE — 3288F FALL RISK ASSESSMENT DOCD: CPT | Mod: S$GLB,,, | Performed by: INTERNAL MEDICINE

## 2023-02-22 PROCEDURE — 1101F PT FALLS ASSESS-DOCD LE1/YR: CPT | Mod: S$GLB,,, | Performed by: INTERNAL MEDICINE

## 2023-02-22 PROCEDURE — 99213 PR OFFICE/OUTPT VISIT, EST, LEVL III, 20-29 MIN: ICD-10-PCS | Mod: S$GLB,,, | Performed by: INTERNAL MEDICINE

## 2023-02-22 PROCEDURE — 1160F RVW MEDS BY RX/DR IN RCRD: CPT | Mod: S$GLB,,, | Performed by: INTERNAL MEDICINE

## 2023-02-22 PROCEDURE — 1160F PR REVIEW ALL MEDS BY PRESCRIBER/CLIN PHARMACIST DOCUMENTED: ICD-10-PCS | Mod: S$GLB,,, | Performed by: INTERNAL MEDICINE

## 2023-02-22 PROCEDURE — 3077F SYST BP >= 140 MM HG: CPT | Mod: S$GLB,,, | Performed by: INTERNAL MEDICINE

## 2023-02-22 PROCEDURE — 1159F MED LIST DOCD IN RCRD: CPT | Mod: S$GLB,,, | Performed by: INTERNAL MEDICINE

## 2023-02-22 PROCEDURE — 1126F PR PAIN SEVERITY QUANTIFIED, NO PAIN PRESENT: ICD-10-PCS | Mod: S$GLB,,, | Performed by: INTERNAL MEDICINE

## 2023-02-22 PROCEDURE — 1159F PR MEDICATION LIST DOCUMENTED IN MEDICAL RECORD: ICD-10-PCS | Mod: S$GLB,,, | Performed by: INTERNAL MEDICINE

## 2023-02-22 PROCEDURE — 3288F PR FALLS RISK ASSESSMENT DOCUMENTED: ICD-10-PCS | Mod: S$GLB,,, | Performed by: INTERNAL MEDICINE

## 2023-02-22 PROCEDURE — 3078F DIAST BP <80 MM HG: CPT | Mod: S$GLB,,, | Performed by: INTERNAL MEDICINE

## 2023-02-22 PROCEDURE — 99213 OFFICE O/P EST LOW 20 MIN: CPT | Mod: S$GLB,,, | Performed by: INTERNAL MEDICINE

## 2023-02-22 NOTE — PROGRESS NOTES
Pt Name:  Adiel Gerber  Pt :  1945  Pt MRN:  [id number]    Date: 2023  Provider: Sylvia Fishman    Reason for visit: seeing for ckd.      Chief Complaint:   Chief Complaint   Patient presents with    Chronic Kidney Disease     Stage-5, Cr-4.61, GFR-12       HPI:  [unfilled]  No complaints. Has seen surgeon for av fistula.      History:   Past Medical History:   Diagnosis Date    Arthritis     CHF (congestive heart failure)     CKD (chronic kidney disease)     HTN (hypertension)     Hypercholesteremia     Hyponatremia     Obesity, unspecified     TOBIAS (obstructive sleep apnea)     Proteinuria     Stroke     Type 2 diabetes mellitus      Past Surgical History:   Procedure Laterality Date    ANKLE SURGERY      HEMORRHOID SURGERY      NASAL SINUS SURGERY       Family History   Problem Relation Age of Onset    Hypertension Father     Hyperlipidemia Father     Diabetes Father     Heart disease Father     Vision loss Father     Diabetes Sister      Social History     Substance and Sexual Activity   Alcohol Use Yes    Comment: occasionally     Social History     Substance and Sexual Activity   Drug Use Never     Social History     Substance and Sexual Activity   Sexual Activity Not Currently     reports that he is not currently sexually active.  Social History     Tobacco Use   Smoking Status Former    Years: 17.00    Types: Cigarettes    Quit date: 2004    Years since quittin.4   Smokeless Tobacco Never       Allergies:  Review of patient's allergies indicates:   Allergen Reactions    Milk containing products Diarrhea       [unfilled]    ROS:   Constitutional:  Denies fever or chills   Eyes:  Denies change in visual acuity   HENT:  Denies nasal congestion or sore throat   Respiratory:  As in the history of the present illness.   Cardiovascular:  As in the history of the present illness.   GI:  As in the history of the present illness.    Musculoskeletal:  Denies back pain or joint pain   Integument:  " Denies rash   Neurologic:  Denies headache, focal weakness or sensory changes   Endocrine:  Denies polyuria or polydipsia   Lymphatic:  Denies swollen glands   Psychiatric:  Denies depression or anxiety    Physical Exam:   Vitals:   Vitals:    02/22/23 1528 02/22/23 1533   BP: (!) 206/84 (!) 190/78   Pulse: 79    Weight: 90.4 kg (199 lb 4.8 oz)    Height: 5' 8" (1.727 m)    PainSc: 0-No pain      Body mass index is 30.3 kg/m².    Constitutional:  Well developed, well nourished, and in no acute distress   Eyes:  PERRLA, conjunctiva normal   HENT:  Atraumatic, external ears normal, nose normal.  Neck: There is no jugular venous distension or thyromegaly.   Respiratory:  No respiratory distress, normal breath sounds, no rales, no wheezing   Cardiovascular:  Normal rate, and a regular rhythm, no murmurs, no gallops, no rub, and no edema.  GI:  Normal bowel sounds.  Musculoskeletal:  No deformities.   Neurologic:  Alert & oriented x 3, CN 2-12 normal, normal motor function, and no asterixis.   Psychiatric:  Speech and behavior appropriate.    Labs/Tests:  Lab Results   Component Value Date     11/03/2022     11/03/2022    K 4.2 11/03/2022    K 4.2 11/03/2022     11/03/2022     11/03/2022    CO2 27 11/03/2022    CO2 27 11/03/2022    BUN 74 (H) 11/03/2022    BUN 74 (H) 11/03/2022    CREATININE 3.79 (H) 11/03/2022    CREATININE 3.79 (H) 11/03/2022    CREATININE 3.77 (H) 08/03/2022    GLUCOSE 4+ (A) 02/06/2023    CALCIUM 7.5 (L) 11/03/2022    CALCIUM 7.5 (L) 11/03/2022    PHOSPHORUS 3.3 02/07/2023    ALBUMIN 3.9 11/03/2022    ALBUMIN 3.9 11/03/2022    EGFRNONAA 15 (L) 11/03/2022    EGFRNONAA 15 (L) 11/03/2022    EGFRNONAA 15 (L) 08/03/2022    ESTGFRAFRICA 17 (L) 11/03/2022    ESTGFRAFRICA 17 (L) 11/03/2022    ESTGFRAFRICA 17 (L) 08/03/2022    PTH 65 02/20/2023    HGB 8.6 (L) 02/20/2023    HGB 7.6 (L) 02/07/2023    HGB 8.8 (L) 02/06/2023    HGBA1C 9.1 (H) 02/06/2023    IRON 16 (L) 02/06/2023    TIBC " 276 02/06/2023    FERRITIN 114.9 12/02/2021    TRIG 167 (H) 11/02/2021    CHOL 187 11/02/2021    HDL 37 (L) 11/02/2021    LDLCALC 117 (H) 11/02/2021    LABVLDL 33 (H) 11/02/2021    [id number] 20.6 (L) 01/10/2023       Assessment & Plan:    Visit Diagnosis:   [unfilled]  [unfilled]    Problem List: There is no problem list on file for this patient.    Ckd 5 - for vein mapping for av fistula    Htn - stable. Continue meds    Iddm        Follow Up:   Follow up in about 2 months (around 4/22/2023).

## 2023-03-01 ENCOUNTER — OUTSIDE PLACE OF SERVICE (OUTPATIENT)
Dept: NEPHROLOGY | Facility: CLINIC | Age: 78
End: 2023-03-01
Payer: COMMERCIAL

## 2023-03-15 PROCEDURE — 99222 PR INITIAL HOSPITAL CARE,LEVL II: ICD-10-PCS | Mod: ,,, | Performed by: INTERNAL MEDICINE

## 2023-03-15 PROCEDURE — 99222 1ST HOSP IP/OBS MODERATE 55: CPT | Mod: ,,, | Performed by: INTERNAL MEDICINE

## 2023-03-16 PROCEDURE — 99232 SBSQ HOSP IP/OBS MODERATE 35: CPT | Mod: ,,, | Performed by: INTERNAL MEDICINE

## 2023-03-16 PROCEDURE — 99232 PR SUBSEQUENT HOSPITAL CARE,LEVL II: ICD-10-PCS | Mod: ,,, | Performed by: INTERNAL MEDICINE

## 2023-03-17 PROCEDURE — 99232 SBSQ HOSP IP/OBS MODERATE 35: CPT | Mod: ,,, | Performed by: INTERNAL MEDICINE

## 2023-03-17 PROCEDURE — 99232 PR SUBSEQUENT HOSPITAL CARE,LEVL II: ICD-10-PCS | Mod: ,,, | Performed by: INTERNAL MEDICINE

## 2023-03-18 PROCEDURE — 99232 SBSQ HOSP IP/OBS MODERATE 35: CPT | Mod: ,,, | Performed by: INTERNAL MEDICINE

## 2023-03-18 PROCEDURE — 99232 PR SUBSEQUENT HOSPITAL CARE,LEVL II: ICD-10-PCS | Mod: ,,, | Performed by: INTERNAL MEDICINE

## 2023-03-19 PROCEDURE — 99232 PR SUBSEQUENT HOSPITAL CARE,LEVL II: ICD-10-PCS | Mod: ,,, | Performed by: INTERNAL MEDICINE

## 2023-03-19 PROCEDURE — 99232 SBSQ HOSP IP/OBS MODERATE 35: CPT | Mod: ,,, | Performed by: INTERNAL MEDICINE

## 2023-03-20 PROCEDURE — 99233 SBSQ HOSP IP/OBS HIGH 50: CPT | Mod: ,,, | Performed by: INTERNAL MEDICINE

## 2023-03-20 PROCEDURE — 99233 PR SUBSEQUENT HOSPITAL CARE,LEVL III: ICD-10-PCS | Mod: ,,, | Performed by: INTERNAL MEDICINE

## 2023-03-21 PROCEDURE — 99233 SBSQ HOSP IP/OBS HIGH 50: CPT | Mod: ,,, | Performed by: INTERNAL MEDICINE

## 2023-03-21 PROCEDURE — 99233 PR SUBSEQUENT HOSPITAL CARE,LEVL III: ICD-10-PCS | Mod: ,,, | Performed by: INTERNAL MEDICINE

## 2023-03-22 PROCEDURE — 99232 SBSQ HOSP IP/OBS MODERATE 35: CPT | Mod: ,,, | Performed by: INTERNAL MEDICINE

## 2023-03-22 PROCEDURE — 99232 PR SUBSEQUENT HOSPITAL CARE,LEVL II: ICD-10-PCS | Mod: ,,, | Performed by: INTERNAL MEDICINE

## 2023-03-23 PROCEDURE — 99232 PR SUBSEQUENT HOSPITAL CARE,LEVL II: ICD-10-PCS | Mod: ,,, | Performed by: INTERNAL MEDICINE

## 2023-03-23 PROCEDURE — 99232 SBSQ HOSP IP/OBS MODERATE 35: CPT | Mod: ,,, | Performed by: INTERNAL MEDICINE

## 2023-03-24 PROCEDURE — 99232 SBSQ HOSP IP/OBS MODERATE 35: CPT | Mod: ,,, | Performed by: INTERNAL MEDICINE

## 2023-03-24 PROCEDURE — 99232 PR SUBSEQUENT HOSPITAL CARE,LEVL II: ICD-10-PCS | Mod: ,,, | Performed by: INTERNAL MEDICINE

## 2023-03-25 PROCEDURE — 99232 PR SUBSEQUENT HOSPITAL CARE,LEVL II: ICD-10-PCS | Mod: ,,, | Performed by: INTERNAL MEDICINE

## 2023-03-25 PROCEDURE — 99232 SBSQ HOSP IP/OBS MODERATE 35: CPT | Mod: ,,, | Performed by: INTERNAL MEDICINE

## 2023-03-26 PROCEDURE — 99232 SBSQ HOSP IP/OBS MODERATE 35: CPT | Mod: ,,, | Performed by: INTERNAL MEDICINE

## 2023-03-26 PROCEDURE — 99232 PR SUBSEQUENT HOSPITAL CARE,LEVL II: ICD-10-PCS | Mod: ,,, | Performed by: INTERNAL MEDICINE

## 2023-03-27 PROCEDURE — 99232 PR SUBSEQUENT HOSPITAL CARE,LEVL II: ICD-10-PCS | Mod: ,,, | Performed by: INTERNAL MEDICINE

## 2023-03-27 PROCEDURE — 99232 SBSQ HOSP IP/OBS MODERATE 35: CPT | Mod: ,,, | Performed by: INTERNAL MEDICINE

## 2023-03-28 ENCOUNTER — PATIENT MESSAGE (OUTPATIENT)
Dept: RESEARCH | Facility: HOSPITAL | Age: 78
End: 2023-03-28
Payer: COMMERCIAL

## 2023-03-28 PROCEDURE — 99232 PR SUBSEQUENT HOSPITAL CARE,LEVL II: ICD-10-PCS | Mod: ,,, | Performed by: INTERNAL MEDICINE

## 2023-03-28 PROCEDURE — 99232 SBSQ HOSP IP/OBS MODERATE 35: CPT | Mod: ,,, | Performed by: INTERNAL MEDICINE

## 2023-03-29 PROCEDURE — 99232 SBSQ HOSP IP/OBS MODERATE 35: CPT | Mod: ,,, | Performed by: INTERNAL MEDICINE

## 2023-03-29 PROCEDURE — 99232 PR SUBSEQUENT HOSPITAL CARE,LEVL II: ICD-10-PCS | Mod: ,,, | Performed by: INTERNAL MEDICINE

## 2023-03-30 PROCEDURE — 99232 SBSQ HOSP IP/OBS MODERATE 35: CPT | Mod: ,,, | Performed by: INTERNAL MEDICINE

## 2023-03-30 PROCEDURE — 99232 PR SUBSEQUENT HOSPITAL CARE,LEVL II: ICD-10-PCS | Mod: ,,, | Performed by: INTERNAL MEDICINE

## 2023-03-31 PROCEDURE — 99232 PR SUBSEQUENT HOSPITAL CARE,LEVL II: ICD-10-PCS | Mod: ,,, | Performed by: INTERNAL MEDICINE

## 2023-03-31 PROCEDURE — 99232 SBSQ HOSP IP/OBS MODERATE 35: CPT | Mod: ,,, | Performed by: INTERNAL MEDICINE

## 2023-04-01 ENCOUNTER — OUTSIDE PLACE OF SERVICE (OUTPATIENT)
Dept: NEPHROLOGY | Facility: CLINIC | Age: 78
End: 2023-04-01
Payer: COMMERCIAL

## 2023-04-22 ENCOUNTER — HOSPITAL ENCOUNTER (OUTPATIENT)
Dept: TELEMEDICINE | Facility: HOSPITAL | Age: 78
Discharge: HOME | End: 2023-04-22
Payer: COMMERCIAL

## 2023-04-22 DIAGNOSIS — R41.82 ALTERED MENTAL STATUS, UNSPECIFIED ALTERED MENTAL STATUS TYPE: ICD-10-CM

## 2023-04-22 PROCEDURE — G0426 INPT/ED TELECONSULT50: HCPCS | Mod: GT,,, | Performed by: STUDENT IN AN ORGANIZED HEALTH CARE EDUCATION/TRAINING PROGRAM

## 2023-04-22 PROCEDURE — G0426 PR INPT TELEHEALTH CONSULT 50M: ICD-10-PCS | Mod: GT,,, | Performed by: STUDENT IN AN ORGANIZED HEALTH CARE EDUCATION/TRAINING PROGRAM

## 2023-04-22 NOTE — SUBJECTIVE & OBJECTIVE
Woke up with symptoms?: yes    Recent bleeding noted: no     Does the patient take any Blood Thinners? yes     Medications: Anticoagulants:  apixaban/Eliquis    Past Medical History: hypertension, diabetes, hyperlipidemia and Afib    Past Surgical History: any recent surgeries (within last month) cholecystectomy    Family History: no relevant history    Social History: no smoking, no drinking, no drugs    Allergies: Milk Containing Products     Review of Systems   The following systems were reviewed with pertinent positives and negatives documented in the HPI: Constitutional, Eyes, CV, Respiratory, GI, , Musculoskeletal, Skin, Neurological, Psychiatric      Objective:   Vitals: There were no vitals taken for this visit.     CT READ: Yes  No hemmorhage. No mass effect. No early infarct signs.     Physical Exam  Vitals reviewed.   Constitutional:       General: He is not in acute distress.     Appearance: He is ill-appearing.   HENT:      Head: Normocephalic and atraumatic.   Eyes:      General: Lids are normal.      Extraocular Movements: Extraocular movements intact.   Cardiovascular:      Rate and Rhythm: Normal rate and regular rhythm.   Pulmonary:      Effort: Pulmonary effort is normal. No respiratory distress.   Abdominal:      General: Abdomen is flat. There is no distension.   Musculoskeletal:         General: No deformity or signs of injury. Normal range of motion.      Cervical back: Normal range of motion. No rigidity.   Neurological:      Mental Status: He is disoriented.      Sensory: Sensory deficit present.      Motor: Weakness present.      Comments: Generalized weakness, does not lift any extremity antigravity, lethargic, minimal verbal response, right-side numbness              Clear

## 2023-04-22 NOTE — HPI
77M with a PMHx atrial fibrillation on eliquis, CKD on HD, prior stroke presenting with lethargy and altered mentation. Lives in a nursing home. Initial LKN unknown at the time the telestroke consult was placed. Later on, was thought to be sometime last night, unknown time. Nurse came to wake him up this morning and found him lethargic.     BP 84/48. WBC reportedly 21 per ED team. Afebrile. H/o of cholecystectomy 03/28/23.

## 2023-04-22 NOTE — CONSULTS
[facility]  Vascular Neurology  Comprehensive Stroke Center  TeleVascular Neurology Acute Consultation Note      Consults    Consulting Provider: ELSY URBAN JR.  Current Providers  No providers found    Patient Location: [facility] - [facility] Emergency Department  Spoke hospital nurse at bedside with patient assisting consultant.     Patient information was obtained from spouse/SO.         Assessment/Plan:       Diagnoses:   Neuro  Altered mental status  77M presenting with altered mentation, lethargy. LKN yesterday, unknown time. His exam is significant for generalized weakness, dysarthria, right-sided numbness (chronic per family). He appears more somnolent, which is different today.     NCCTH reviewed with no acute intracranial hemorrhage or evidence of large territory ischemic. Patient is not a candidate for IV thrombolysis given unknown LKN and full-dose AC. CTA head/neck showed no large vessel occlusion or high-grade stenosis.     No acute stroke intervention is indicated at this time. Given hypotension and elevated white count, recommend toxic/metabolic/infectious work-up for encephalopathy. If there is concern for persistent altered mentation despite medical interventions, recommend follow-up MRI brain without contrast to r/o acute ischemia.               STROKE DOCUMENTATION     Acute Stroke Times:   Acute Stroke Times   Last Known Normal Date: 04/21/23  Unknown Normal Time: Unknown Time  Symptom Onset Date: 04/22/23  Unknown Symptom Onset Time: Unknown Time  Stroke Team Called Date: 04/22/23  Stroke Team Arrival Date: 04/22/23  Stroke Team Arrival Time: 1220  CT Interpretation Time: 1225  Thrombolytic Therapy Recommended: No  Thrombectomy Recommended: No    NIH Scale:  Interval: baseline  1a. Level of Consciousness: 1-->Not alert, but arousable by minor stimulation to obey, answer, or respond  1b. LOC  Questions: 2-->Answers neither question correctly  1c. LOC Commands: 2-->Performs neither task correctly  2. Best Gaze: 0-->Normal  3. Visual: 0-->No visual loss  4. Facial Palsy: 0-->Normal symmetrical movements  5a. Motor Arm, Left: 3-->No effort against gravity, limb falls  5b. Motor Arm, Right: 3-->No effort against gravity, limb falls  6a. Motor Leg, Left: 3-->No effort against gravity, leg falls to bed immediately  6b. Motor Leg, Right: 3-->No effort against gravity, leg falls to bed immediately  7. Limb Ataxia: 0-->Absent  8. Sensory: 1-->Mild-to-moderate sensory loss, patient feels pinprick is less sharp or is dull on the affected side, or there is a loss of superficial pain with pinprick, but patient is aware of being touched (Numbness right leg)  9. Best Language: 1-->Mild-to-moderate aphasia, some obvious loss of fluency or facility of comprehension, without significant limitation on ideas expressed or form of expression. Reduction of speech and/or comprehension, however, makes conversation. . . (see row details)  10. Dysarthria: 1-->Mild-to-moderate dysarthria, patient slurs at least some words and, at worst, can be understood with some difficulty  11. Extinction and Inattention (formerly Neglect): 0-->No abnormality  Total (NIH Stroke Scale): 20     Modified Mathias Score: 0  Jan Coma Scale:    ABCD2 Score:    [id number] Score:   HAS -BLED Score:   ICH Score:   Hunt & Leiva Classification:       There were no vitals taken for this visit.  Eligible for thrombolytic therapy?: No  Thrombolytic therapy recomended: Thrombolytic therapy not recommended due to Unknown/unclear onset   Possible Interventional Revascularization Candidate? No; No large vessel occlusion identified on imaging     Disposition Recommendation: pending further studies    Subjective:     History of Present Illness:  77M with a PMHx atrial fibrillation on eliquis, CKD on HD, prior stroke presenting with lethargy and altered mentation.  Lives in a nursing home. Initial LKN unknown at the time the telestroke consult was placed. Later on, was thought to be sometime last night, unknown time. Nurse came to wake him up this morning and found him lethargic.     BP 84/48. WBC reportedly 21 per ED team. Afebrile. H/o of cholecystectomy 03/28/23.           Woke up with symptoms?: yes    Recent bleeding noted: no     Does the patient take any Blood Thinners? yes     Medications: Anticoagulants:  apixaban/Eliquis    Past Medical History: hypertension, diabetes, hyperlipidemia and Afib    Past Surgical History: any recent surgeries (within last month) cholecystectomy    Family History: no relevant history    Social History: no smoking, no drinking, no drugs    Allergies: Milk Containing Products     Review of Systems   The following systems were reviewed with pertinent positives and negatives documented in the HPI: Constitutional, Eyes, CV, Respiratory, GI, , Musculoskeletal, Skin, Neurological, Psychiatric      Objective:   Vitals: There were no vitals taken for this visit.     CT READ: Yes  No hemmorhage. No mass effect. No early infarct signs.     Physical Exam  Vitals reviewed.   Constitutional:       General: He is not in acute distress.     Appearance: He is ill-appearing.   HENT:      Head: Normocephalic and atraumatic.   Eyes:      General: Lids are normal.      Extraocular Movements: Extraocular movements intact.   Cardiovascular:      Rate and Rhythm: Normal rate and regular rhythm.   Pulmonary:      Effort: Pulmonary effort is normal. No respiratory distress.   Abdominal:      General: Abdomen is flat. There is no distension.   Musculoskeletal:         General: No deformity or signs of injury. Normal range of motion.      Cervical back: Normal range of motion. No rigidity.   Neurological:      Mental Status: He is disoriented.      Sensory: Sensory deficit present.      Motor: Weakness present.      Comments: Generalized weakness, does not lift  any extremity antigravity, lethargic, minimal verbal response, right-side numbness               Recommended the emergency room physician to have a brief discussion with the patient and/or family if available regarding the  risks and benefits of treatment, and to briefly document the occurrence of that discussion in his clinical encounter note.     The attending portion of this evaluation, treatment, and documentation was performed per Lluvia Gill MD via audiovisual.    Billing code:  (non-intervention mild to moderate stroke, TIA, some mimics)      This patient has a critical neurological condition/illness, with some potential for high morbidity and mortality.  There is a moderate probability for acute neurological change leading to clinical and possibly life-threatening deterioration requiring highest level of physician preparedness for urgent intervention.  Care was coordinated with other physicians involved in the patient's care.  Radiologic studies and laboratory data were reviewed and interpreted, and plan of care was re-assessed based on the results.  Diagnosis, treatment options and prognosis may have been discussed with the patient and/or family members or caregiver.    In your opinion, this was a: Tier 2 Van Negative    Consult End Time: 2:53 PM     Lluvia Gill MD, PhD  [facility]  Vascular Neurology   [facility]

## 2023-04-23 PROCEDURE — 99222 PR INITIAL HOSPITAL CARE,LEVL II: ICD-10-PCS | Mod: ,,, | Performed by: INTERNAL MEDICINE

## 2023-04-23 PROCEDURE — 99222 1ST HOSP IP/OBS MODERATE 55: CPT | Mod: ,,, | Performed by: INTERNAL MEDICINE

## 2023-04-24 PROCEDURE — 99232 PR SUBSEQUENT HOSPITAL CARE,LEVL II: ICD-10-PCS | Mod: ,,, | Performed by: INTERNAL MEDICINE

## 2023-04-24 PROCEDURE — 99232 SBSQ HOSP IP/OBS MODERATE 35: CPT | Mod: ,,, | Performed by: INTERNAL MEDICINE

## 2023-04-25 PROCEDURE — 99232 PR SUBSEQUENT HOSPITAL CARE,LEVL II: ICD-10-PCS | Mod: ,,, | Performed by: INTERNAL MEDICINE

## 2023-04-25 PROCEDURE — 99232 SBSQ HOSP IP/OBS MODERATE 35: CPT | Mod: ,,, | Performed by: INTERNAL MEDICINE

## 2023-04-26 PROCEDURE — 99232 SBSQ HOSP IP/OBS MODERATE 35: CPT | Mod: ,,, | Performed by: INTERNAL MEDICINE

## 2023-04-26 PROCEDURE — 99232 PR SUBSEQUENT HOSPITAL CARE,LEVL II: ICD-10-PCS | Mod: ,,, | Performed by: INTERNAL MEDICINE
